# Patient Record
Sex: FEMALE | Race: WHITE | NOT HISPANIC OR LATINO | Employment: OTHER
[De-identification: names, ages, dates, MRNs, and addresses within clinical notes are randomized per-mention and may not be internally consistent; named-entity substitution may affect disease eponyms.]

---

## 2017-06-17 ENCOUNTER — HEALTH MAINTENANCE LETTER (OUTPATIENT)
Age: 37
End: 2017-06-17

## 2019-10-01 ENCOUNTER — HEALTH MAINTENANCE LETTER (OUTPATIENT)
Age: 39
End: 2019-10-01

## 2021-01-15 ENCOUNTER — HEALTH MAINTENANCE LETTER (OUTPATIENT)
Age: 41
End: 2021-01-15

## 2021-09-04 ENCOUNTER — HEALTH MAINTENANCE LETTER (OUTPATIENT)
Age: 41
End: 2021-09-04

## 2022-02-19 ENCOUNTER — HEALTH MAINTENANCE LETTER (OUTPATIENT)
Age: 42
End: 2022-02-19

## 2022-10-16 ENCOUNTER — HEALTH MAINTENANCE LETTER (OUTPATIENT)
Age: 42
End: 2022-10-16

## 2022-12-07 ENCOUNTER — HOSPITAL ENCOUNTER (EMERGENCY)
Facility: CLINIC | Age: 42
Discharge: HOME | End: 2022-12-07
Attending: EMERGENCY MEDICINE | Admitting: EMERGENCY MEDICINE
Payer: COMMERCIAL

## 2022-12-07 VITALS
RESPIRATION RATE: 20 BRPM | WEIGHT: 170 LBS | SYSTOLIC BLOOD PRESSURE: 116 MMHG | HEART RATE: 102 BPM | TEMPERATURE: 98.1 F | DIASTOLIC BLOOD PRESSURE: 70 MMHG | OXYGEN SATURATION: 97 % | BODY MASS INDEX: 32.1 KG/M2 | HEIGHT: 61 IN

## 2022-12-07 DIAGNOSIS — N61.1 BREAST ABSCESS: ICD-10-CM

## 2022-12-07 LAB
ANION GAP SERPL CALCULATED.3IONS-SCNC: 11 MMOL/L (ref 7–15)
BASOPHILS # BLD AUTO: 0 10E3/UL (ref 0–0.2)
BASOPHILS NFR BLD AUTO: 0 %
BUN SERPL-MCNC: 13.2 MG/DL (ref 6–20)
CALCIUM SERPL-MCNC: 9.1 MG/DL (ref 8.6–10)
CHLORIDE SERPL-SCNC: 101 MMOL/L (ref 98–107)
CREAT SERPL-MCNC: 0.73 MG/DL (ref 0.51–0.95)
DEPRECATED HCO3 PLAS-SCNC: 23 MMOL/L (ref 22–29)
EOSINOPHIL # BLD AUTO: 0.1 10E3/UL (ref 0–0.7)
EOSINOPHIL NFR BLD AUTO: 1 %
ERYTHROCYTE [DISTWIDTH] IN BLOOD BY AUTOMATED COUNT: 12.6 % (ref 10–15)
GFR SERPL CREATININE-BSD FRML MDRD: >90 ML/MIN/1.73M2
GLUCOSE SERPL-MCNC: 84 MG/DL (ref 70–99)
HCG SERPL QL: NEGATIVE
HCT VFR BLD AUTO: 39 % (ref 35–47)
HGB BLD-MCNC: 13.3 G/DL (ref 11.7–15.7)
HOLD SPECIMEN: NORMAL
IMM GRANULOCYTES # BLD: 0 10E3/UL
IMM GRANULOCYTES NFR BLD: 0 %
LACTATE SERPL-SCNC: 0.7 MMOL/L (ref 0.7–2)
LYMPHOCYTES # BLD AUTO: 1.4 10E3/UL (ref 0.8–5.3)
LYMPHOCYTES NFR BLD AUTO: 19 %
MCH RBC QN AUTO: 29 PG (ref 26.5–33)
MCHC RBC AUTO-ENTMCNC: 34.1 G/DL (ref 31.5–36.5)
MCV RBC AUTO: 85 FL (ref 78–100)
MONOCYTES # BLD AUTO: 0.5 10E3/UL (ref 0–1.3)
MONOCYTES NFR BLD AUTO: 7 %
NEUTROPHILS # BLD AUTO: 5.3 10E3/UL (ref 1.6–8.3)
NEUTROPHILS NFR BLD AUTO: 73 %
NRBC # BLD AUTO: 0 10E3/UL
NRBC BLD AUTO-RTO: 0 /100
PLATELET # BLD AUTO: 305 10E3/UL (ref 150–450)
POTASSIUM SERPL-SCNC: 3.7 MMOL/L (ref 3.4–5.3)
RBC # BLD AUTO: 4.58 10E6/UL (ref 3.8–5.2)
SODIUM SERPL-SCNC: 135 MMOL/L (ref 136–145)
WBC # BLD AUTO: 7.2 10E3/UL (ref 4–11)

## 2022-12-07 PROCEDURE — 99284 EMERGENCY DEPT VISIT MOD MDM: CPT | Mod: 25

## 2022-12-07 PROCEDURE — 250N000011 HC RX IP 250 OP 636: Performed by: EMERGENCY MEDICINE

## 2022-12-07 PROCEDURE — 80048 BASIC METABOLIC PNL TOTAL CA: CPT | Performed by: EMERGENCY MEDICINE

## 2022-12-07 PROCEDURE — 83605 ASSAY OF LACTIC ACID: CPT | Performed by: EMERGENCY MEDICINE

## 2022-12-07 PROCEDURE — 258N000003 HC RX IP 258 OP 636: Performed by: EMERGENCY MEDICINE

## 2022-12-07 PROCEDURE — 87205 SMEAR GRAM STAIN: CPT | Performed by: EMERGENCY MEDICINE

## 2022-12-07 PROCEDURE — 87077 CULTURE AEROBIC IDENTIFY: CPT | Performed by: EMERGENCY MEDICINE

## 2022-12-07 PROCEDURE — 85025 COMPLETE CBC W/AUTO DIFF WBC: CPT | Performed by: EMERGENCY MEDICINE

## 2022-12-07 PROCEDURE — 36415 COLL VENOUS BLD VENIPUNCTURE: CPT | Performed by: EMERGENCY MEDICINE

## 2022-12-07 PROCEDURE — 84703 CHORIONIC GONADOTROPIN ASSAY: CPT | Performed by: EMERGENCY MEDICINE

## 2022-12-07 PROCEDURE — 96365 THER/PROPH/DIAG IV INF INIT: CPT

## 2022-12-07 PROCEDURE — 96361 HYDRATE IV INFUSION ADD-ON: CPT

## 2022-12-07 RX ORDER — ONDANSETRON 2 MG/ML
4 INJECTION INTRAMUSCULAR; INTRAVENOUS EVERY 30 MIN PRN
Status: DISCONTINUED | OUTPATIENT
Start: 2022-12-07 | End: 2022-12-07 | Stop reason: HOSPADM

## 2022-12-07 RX ORDER — HYDROCODONE BITARTRATE AND ACETAMINOPHEN 5; 325 MG/1; MG/1
1 TABLET ORAL EVERY 6 HOURS PRN
Qty: 6 TABLET | Refills: 0 | Status: SHIPPED | OUTPATIENT
Start: 2022-12-07 | End: 2022-12-10

## 2022-12-07 RX ORDER — LIDOCAINE 40 MG/G
CREAM TOPICAL
Status: DISCONTINUED | OUTPATIENT
Start: 2022-12-07 | End: 2022-12-07 | Stop reason: HOSPADM

## 2022-12-07 RX ADMIN — TAZOBACTAM SODIUM AND PIPERACILLIN SODIUM 4.5 G: 500; 4 INJECTION, SOLUTION INTRAVENOUS at 14:39

## 2022-12-07 RX ADMIN — SODIUM CHLORIDE 1000 ML: 9 INJECTION, SOLUTION INTRAVENOUS at 14:18

## 2022-12-07 ASSESSMENT — ENCOUNTER SYMPTOMS
WOUND: 1
FEVER: 0
VOMITING: 0
COLOR CHANGE: 1

## 2022-12-07 ASSESSMENT — ACTIVITIES OF DAILY LIVING (ADL): ADLS_ACUITY_SCORE: 35

## 2022-12-07 NOTE — ED TRIAGE NOTES
"Pt had a lumpectomy on the left breast 11/23, got a post op infection and had it drained 12/5 and was started on keflex. This morning pt was in the shower and states \"It burst open and drained a ton of brown puss\" pt states the fluid was coming from the incision.      Triage Assessment     Row Name 12/07/22 1032       Triage Assessment (Adult)    Airway WDL WDL       Respiratory WDL    Respiratory WDL WDL       Skin Circulation/Temperature WDL    Skin Circulation/Temperature WDL WDL       Cardiac WDL    Cardiac WDL WDL       Peripheral/Neurovascular WDL    Peripheral Neurovascular WDL WDL       Cognitive/Neuro/Behavioral WDL    Cognitive/Neuro/Behavioral WDL WDL              "

## 2022-12-07 NOTE — DISCHARGE INSTRUCTIONS
*You may resume diet and activities.  *Warm packs or warm showers/baths several times daily to encourage drainage. Take medications as prescribed.  Ibuprofen for pain, norco for pain not relieved by ibuprofen. Continue your cephalexin.  *Follow-up with Dr. Vela in the office tomorrow as scheduled.   *Return if you develop fever, spreading redness or warmth, faint or feel like you will faint or become worse in any way.

## 2022-12-07 NOTE — ED PROVIDER NOTES
History   Chief Complaint:  Post-op Problem       The history is provided by the patient.      Kayla Braun is a 42 year old female with history of pituitary adenoma and PCOS who presents with post- op problem. Patient reports that she had a radial scar removed from her left breast on 22 at [facility], and had her post- op appointment on 22 at which point the incision looked like it was healing well. She states that by 22 she had developed an infection and had the abscess drained in the office and prescribed Cephalexin. She adds that since then there has been no improvement and she has noticed that the surgery site has become more swollen. She notes that this is even preventing her from sleeping. She reports that she was taking a shower this morning and the abscess opened, along the incision, and drained. She endorses nausea. Denies vomiting or fever    Review of Systems   Constitutional: Negative for fever.   Gastrointestinal: Negative for vomiting.   Skin: Positive for color change and wound.   All other systems reviewed and are negative.      Allergies:  Vicodin [Hydrocodone-Acetaminophen]    Medications:  Lamotrigine   Vortioxetine   Wegovy   Norethindrone   Hydrocodone acetaminophen   Cephalexin     Past Medical History:     Pituitary microadenoma   Metrorrhagia   Radial scar of left breast   Bipolar disorder   Polycystic ovarian syndrome   Varicella   Anxiety     Past Surgical History:    Tonsillectomy   [place] teeth extraction      Radial scar removal     Family History:    Mother- Grave's disease, depression     Social History:  Presents with    Presents via private vehicle   PCP: No primary care provider on file.     Physical Exam     Patient Vitals for the past 24 hrs:   BP Temp Temp src Pulse Resp SpO2 Height Weight   22 1638 -- -- -- -- -- 97 % -- --   22 1637 116/70 -- -- 102 -- 99 % -- --   22 1520 -- -- -- -- -- 100 % -- --   22 1515  "118/67 -- -- 82 -- -- -- --   12/07/22 1029 131/69 98.1  F (36.7  C) Temporal 107 20 100 % 1.549 m (5' 1\") 77.1 kg (170 lb)       Physical Exam  General: Well-nourished, appears to be in pain  Eyes: PERRL, conjunctivae pink no scleral icterus or conjunctival injection  ENT:  Moist mucus membranes, posterior oropharynx clear without erythema or exudates  Breast: Findings consistent with mastitis and underlying fluid collection as seen in the photo below.  Tender throughout.  Respiratory:  Lungs clear to auscultation bilaterally, no crackles/rubs/wheezes.  Good air movement  CV: Normal rate and rhythm, no murmurs/rubs/gallops  GI:  Abdomen soft and non-distended.  Normoactive BS.  No tenderness, guarding or rebound  Skin: Warm, dry.  Redness and warmth consistent with cellulitis as seen in photo below.  Warm.  Mildly tender.  No drainage at the current time.  Subsequently starts draining from the middle of the incision as well as the superior aspect of the incision, brown purulent liquid          Musculoskeletal: No peripheral edema or calf tenderness  Neuro: Alert and oriented to person/place/time  Psychiatric: Normal affect      Emergency Department Course   Laboratory:  Labs Ordered and Resulted from Time of ED Arrival to Time of ED Departure   BASIC METABOLIC PANEL - Abnormal       Result Value    Sodium 135 (*)     Potassium 3.7      Chloride 101      Carbon Dioxide (CO2) 23      Anion Gap 11      Urea Nitrogen 13.2      Creatinine 0.73      Calcium 9.1      Glucose 84      GFR Estimate >90     LACTIC ACID WHOLE BLOOD - Normal    Lactic Acid 0.7     HCG QUALITATIVE PREGNANCY - Normal    hCG Serum Qualitative Negative     CBC WITH PLATELETS AND DIFFERENTIAL    WBC Count 7.2      RBC Count 4.58      Hemoglobin 13.3      Hematocrit 39.0      MCV 85      MCH 29.0      MCHC 34.1      RDW 12.6      Platelet Count 305      % Neutrophils 73      % Lymphocytes 19      % Monocytes 7      % Eosinophils 1      % Basophils 0  "     % Immature Granulocytes 0      NRBCs per 100 WBC 0      Absolute Neutrophils 5.3      Absolute Lymphocytes 1.4      Absolute Monocytes 0.5      Absolute Eosinophils 0.1      Absolute Basophils 0.0      Absolute Immature Granulocytes 0.0      Absolute NRBCs 0.0     AEROBIC BACTERIAL CULTURE ROUTINE      Emergency Department Course:     Reviewed:  I reviewed nursing notes, vitals, past medical history and Care Everywhere    Assessments:  1358 I obtained history and examined the patient as noted above.   1500   I rechecked the patient and explained findings.   1530   I rechecked the patient and explained findings.   1545   I rechecked the patient.  Her breast wound is draining.  I helped her to express as much purulent drainage as possible.  A culture was collected and sent.    Consults:  I spoke with Dr. Dunn of our general surgery group.  She recommended the patient be discharged to follow-up as an outpatient.    I spoke with Dr. Vela, the patient's breast surgeon.  I spoke with Dr. Vela, the patient's breast surgeon.  The patient will follow-up in clinic tomorrow.    Interventions:  Medications   lidocaine 1 % 0.1-1 mL (has no administration in time range)   lidocaine (LMX4) cream (has no administration in time range)   sodium chloride (PF) 0.9% PF flush 3 mL (0 mLs Intracatheter Hold 12/7/22 1440)   sodium chloride (PF) 0.9% PF flush 3 mL (has no administration in time range)   ondansetron (ZOFRAN) injection 4 mg (has no administration in time range)   0.9% sodium chloride BOLUS (0 mLs Intravenous Stopped 12/7/22 1530)   piperacillin-tazobactam (ZOSYN) intermittent infusion 4.5 g (0 g Intravenous Stopped 12/7/22 1530)     Disposition:  The patient was discharged home to follow-up with her surgeon tomorrow.    Impression & Plan   Medical Decision Making:  Kayla Grover is a 42 year old female who comes with breast abscess versus infected breast seroma after surgical procedure.  Fortunately, she does  not appear septic.  Her white count is normal and she is afebrile.  She is not vomiting.  The wound was spontaneously draining earlier but when I saw her it was not draining.  I treated her with Zosyn.  I called and spoke with our surgeon regarding admission and further treatment.  Our surgeon reviewed the chart and recommended follow-up with the patient's own surgeon as an outpatient.  I spoke with the patient's surgeon and they will see the patient tomorrow in clinic.  Given that she is not septic, I do think that acceptable.  The patient surgeon requested that we do an incision and drainage in the ER but this is outside my scope of practice to do breast abscess drainage and fortunately, the patient noticed that the wound was draining spontaneously.  I helped her to express as much purulent drainage as possible and she actually felt significantly better.  I think she will be safe for discharge and follow-up in the clinic.  She can continue on the cephalexin.  I did send another culture in case the first 1 was lost as we were unable to find those results.  She is encouraged to do warm packs and showers to encourage drainage.  She is encouraged to return if she develops a fever, vomiting or any worsening or new symptoms.  At this time, with reasonable clinical confidence, I do believe she safe for discharge home.    Diagnosis:    ICD-10-CM    1. Breast abscess  N61.1           Scribe Disclosure:  I, Laurie Acostaceasar, am serving as a scribe at 1:52 PM on 12/7/2022 to document services personally performed by Teetee Ribera MD based on my observations and the provider's statements to me.            Teetee Ribera MD  12/07/22 1352

## 2022-12-10 ENCOUNTER — TELEPHONE (OUTPATIENT)
Dept: EMERGENCY MEDICINE | Facility: CLINIC | Age: 42
End: 2022-12-10

## 2022-12-10 LAB
BACTERIA ABSC ANAEROBE+AEROBE CULT: ABNORMAL
BACTERIA ABSC ANAEROBE+AEROBE CULT: ABNORMAL
GRAM STAIN RESULT: ABNORMAL
GRAM STAIN RESULT: ABNORMAL

## 2022-12-10 NOTE — TELEPHONE ENCOUNTER
[facility] Emergency Department/Urgent Care Lab result notification:    [place] ED lab result protocol used  Culture     Reason for call  Notify of lab results, assess symptoms,  review ED providers recommendations/discharge instructions (if necessary) and advise per ED lab result f/u protocol    Lab Result   Final Abscess Aerobic Bacterial Culture (specimen - left breast) report on 12/10/22  [facility] Emergency Dept discharge antibiotic prescribed: None, to continue on Cephalexin previously prescribed by her surgery provider.   #1. Bacteria, 1+ Staphylococcus aureus ,  is [SUSCEPTIBLE] to antibiotic.  Incision and Drainage performed in the [facility] ED: No  Recommendations in treatment per [facility] ED lab result culture protocol  Information table from Emergency Dept Provider visit on 12/7/22  Symptoms reported at ED visit (Chief complaint, HPI) Post-op Problem        The history is provided by the patient.      Kayla Braun is a 42 year old female with history of pituitary adenoma and PCOS who presents with post- op problem. Patient reports that she had a radial scar removed from her left breast on 11/23/22 at [facility], and had her post- op appointment on 12/1/22 at which point the incision looked like it was healing well. She states that by 12/5/22 she had developed an infection and had the abscess drained in the office and prescribed Cephalexin. She adds that since then there has been no improvement and she has noticed that the surgery site has become more swollen. She notes that this is even preventing her from sleeping. She reports that she was taking a shower this morning and the abscess opened, along the incision, and drained. She endorses nausea. Denies vomiting or fever   ED providers Impression and Plan (applicable information) Kayla Braun Reilly is a 42 year old female who comes with breast abscess versus infected breast seroma after surgical procedure.   Fortunately, she does not appear septic.  Her white count is normal and she is afebrile.  She is not vomiting.  The wound was spontaneously draining earlier but when I saw her it was not draining.  I treated her with Zosyn.  I called and spoke with our surgeon regarding admission and further treatment.  Our surgeon reviewed the chart and recommended follow-up with the patient's own surgeon as an outpatient.  I spoke with the patient's surgeon and they will see the patient tomorrow in clinic.  Given that she is not septic, I do think that acceptable.  The patient surgeon requested that we do an incision and drainage in the ER but this is outside my scope of practice to do breast abscess drainage and fortunately, the patient noticed that the wound was draining spontaneously.  I helped her to express as much purulent drainage as possible and she actually felt significantly better.  I think she will be safe for discharge and follow-up in the clinic.  She can continue on the cephalexin.  I did send another culture in case the first 1 was lost as we were unable to find those results.  She is encouraged to do warm packs and showers to encourage drainage.  She is encouraged to return if she develops a fever, vomiting or any worsening or new symptoms.  At this time, with reasonable clinical confidence, I do believe she safe for discharge home.   Miscellaneous information na     RN Assessment (Patient s current Symptoms), include time called.  [Insert Left message here if message left]  12:38PM: Left voicemail message requesting a call back to [facility] ED Lab Result RN at [phone].  RN is available every day between 9 a.m. and 5:30 p.m.    Lian Mancuso RN  [facility] [place]  Emergency Dept Lab Result RN  Ph# [phone]     Copy of Lab result   Abscess Aerobic Bacterial Culture Routine with Gram Stain  Order: [id number]   Collected 12/7/2022  4:12 PM      Status: Final result       Visible to patient: Yes (not seen)     Specimen Information: Breast, Left; Abscess    3 Result Notes  Culture 1+ Staphylococcus aureus Abnormal        1+ Normal marybeth                Gram Stain Result   Abnormal   1+ Gram positive cocci      3+ WBC seen   Predominantly PMNs           Resulting Agency: [facility]     Susceptibility     Staphylococcus aureus     SKIP     Clindamycin <=0.25 ug/mL Susceptible     Erythromycin <=0.25 ug/mL Susceptible     Gentamicin <=0.5 ug/mL Susceptible     Oxacillin 0.5 ug/mL Susceptible 1     Tetracycline <=1 ug/mL Susceptible     Trimethoprim/Sulfamethoxazole <=0.5/9.5 u... Susceptible     Vancomycin <=0.5 ug/mL Susceptible              1 Oxacillin susceptible isolates are susceptible to cephalosporins (example: cefazolin and cephalexin) and beta lactam combination agents. Oxacillin resistant isolates are resistant to these agents.            Specimen Collected: 12/07/22  4:12 PM Last Resulted: 12/10/22  4:57 AM

## 2022-12-10 NOTE — TELEPHONE ENCOUNTER
RN Assessment (Patient s current Symptoms), include time called.  [Insert Left message here if message left]  12:49PM; Patient states she is doing much better. Has another follow up with her surgeon on the 15th. Has also seen her surgeon twice since she was discharged.     RN Recommendations/Instructions per [place] ED lab result protocol  Patient notified of lab result and treatment recommendations.    The patient states that the surgeons culture came back showing the same bacteria, so she is aware of the result and is doing much better and will be having another follow up with the surgeon.  The patient is comfortable with the information given and has no further questions.        Please Contact your PCP clinic or return to the Emergency department if your    Symptoms worsen or other concerning symptom's.    PCP follow-up Questions asked: YES       Lian Mancuso RN  [facility]  Emergency Dept Lab Result RN  Ph# [phone]

## 2023-08-26 ENCOUNTER — HEALTH MAINTENANCE LETTER (OUTPATIENT)
Age: 43
End: 2023-08-26

## 2024-10-19 ENCOUNTER — HEALTH MAINTENANCE LETTER (OUTPATIENT)
Age: 44
End: 2024-10-19

## 2025-01-25 ENCOUNTER — HEALTH MAINTENANCE LETTER (OUTPATIENT)
Age: 45
End: 2025-01-25